# Patient Record
Sex: MALE | Race: WHITE | NOT HISPANIC OR LATINO | Employment: OTHER | URBAN - METROPOLITAN AREA
[De-identification: names, ages, dates, MRNs, and addresses within clinical notes are randomized per-mention and may not be internally consistent; named-entity substitution may affect disease eponyms.]

---

## 2019-08-05 ENCOUNTER — TELEPHONE (OUTPATIENT)
Dept: ENDOCRINOLOGY | Facility: CLINIC | Age: 59
End: 2019-08-05

## 2019-08-05 NOTE — TELEPHONE ENCOUNTER
REC'D A NP REFERRAL FROM DR. JOHNNA DONIS..CALLED THE PATIENT ON THE SUGGESTED NUMBER AND HAD TO LEAVE HIM A VM TO CALL THE [facility]

## 2024-05-04 ENCOUNTER — ANESTHESIA EVENT (OUTPATIENT)
Dept: GASTROENTEROLOGY | Facility: HOSPITAL | Age: 64
End: 2024-05-04
Payer: MEDICARE

## 2024-05-04 ENCOUNTER — APPOINTMENT (OUTPATIENT)
Dept: ULTRASOUND IMAGING | Facility: HOSPITAL | Age: 64
DRG: 378 | End: 2024-05-04
Payer: MEDICARE

## 2024-05-04 ENCOUNTER — HOSPITAL ENCOUNTER (INPATIENT)
Facility: HOSPITAL | Age: 64
LOS: 2 days | Discharge: LEFT BEFORE DISCHARGE | DRG: 378 | End: 2024-05-06
Attending: INTERNAL MEDICINE | Admitting: INTERNAL MEDICINE
Payer: MEDICARE

## 2024-05-04 ENCOUNTER — INPATIENT HOSPITAL (OUTPATIENT)
Dept: URBAN - METROPOLITAN AREA HOSPITAL 84 | Facility: HOSPITAL | Age: 64
End: 2024-05-04
Payer: MEDICAID

## 2024-05-04 DIAGNOSIS — K92.2 GASTROINTESTINAL HEMORRHAGE, UNSPECIFIED: ICD-10-CM

## 2024-05-04 DIAGNOSIS — D64.9 ANEMIA, UNSPECIFIED: ICD-10-CM

## 2024-05-04 DIAGNOSIS — Z87.898 H/O ASCITES: ICD-10-CM

## 2024-05-04 DIAGNOSIS — K92.2 UPPER GI BLEED: Primary | ICD-10-CM

## 2024-05-04 DIAGNOSIS — K74.60 UNSPECIFIED CIRRHOSIS OF LIVER: ICD-10-CM

## 2024-05-04 DIAGNOSIS — R18.8 OTHER ASCITES: ICD-10-CM

## 2024-05-04 LAB
ALBUMIN SERPL-MCNC: 2.9 G/DL (ref 3.5–5.2)
ALP SERPL-CCNC: 98 U/L (ref 39–117)
ALT SERPL W P-5'-P-CCNC: 39 U/L (ref 1–41)
ANION GAP SERPL CALCULATED.3IONS-SCNC: 10 MMOL/L (ref 5–15)
APAP SERPL-MCNC: <5 MCG/ML (ref 0–30)
AST SERPL-CCNC: 54 U/L (ref 1–40)
BASOPHILS # BLD AUTO: 0.04 10*3/MM3 (ref 0–0.2)
BASOPHILS NFR BLD AUTO: 0.7 % (ref 0–1.5)
BILIRUB CONJ SERPL-MCNC: 1.8 MG/DL (ref 0–0.3)
BILIRUB INDIRECT SERPL-MCNC: 1.4 MG/DL
BILIRUB SERPL-MCNC: 3.2 MG/DL (ref 0–1.2)
BUN SERPL-MCNC: 27 MG/DL (ref 8–23)
BUN/CREAT SERPL: 30.3 (ref 7–25)
CALCIUM SPEC-SCNC: 8 MG/DL (ref 8.6–10.5)
CHLORIDE SERPL-SCNC: 110 MMOL/L (ref 98–107)
CO2 SERPL-SCNC: 24 MMOL/L (ref 22–29)
CREAT SERPL-MCNC: 0.89 MG/DL (ref 0.76–1.27)
DEPRECATED RDW RBC AUTO: 61.7 FL (ref 37–54)
EGFRCR SERPLBLD CKD-EPI 2021: 95.7 ML/MIN/1.73
EOSINOPHIL # BLD AUTO: 0.05 10*3/MM3 (ref 0–0.4)
EOSINOPHIL NFR BLD AUTO: 0.9 % (ref 0.3–6.2)
ERYTHROCYTE [DISTWIDTH] IN BLOOD BY AUTOMATED COUNT: 17 % (ref 12.3–15.4)
FERRITIN SERPL-MCNC: 329.7 NG/ML (ref 30–400)
GLUCOSE BLDC GLUCOMTR-MCNC: 151 MG/DL (ref 70–105)
GLUCOSE SERPL-MCNC: 166 MG/DL (ref 65–99)
HAV IGM SERPL QL IA: ABNORMAL
HBV CORE IGM SERPL QL IA: ABNORMAL
HBV SURFACE AG SERPL QL IA: ABNORMAL
HCT VFR BLD AUTO: 26.5 % (ref 37.5–51)
HCT VFR BLD AUTO: 27.8 % (ref 37.5–51)
HCV AB SER QL: REACTIVE
HGB BLD-MCNC: 8.9 G/DL (ref 13–17.7)
HGB BLD-MCNC: 9.3 G/DL (ref 13–17.7)
HIV 1+2 AB+HIV1 P24 AG SERPL QL IA: NORMAL
IMM GRANULOCYTES # BLD AUTO: 0.02 10*3/MM3 (ref 0–0.05)
IMM GRANULOCYTES NFR BLD AUTO: 0.4 % (ref 0–0.5)
INR PPP: 1.15 (ref 0.93–1.1)
IRON 24H UR-MRATE: 195 MCG/DL (ref 59–158)
LYMPHOCYTES # BLD AUTO: 1.61 10*3/MM3 (ref 0.7–3.1)
LYMPHOCYTES NFR BLD AUTO: 28.9 % (ref 19.6–45.3)
MAGNESIUM SERPL-MCNC: 2 MG/DL (ref 1.6–2.4)
MCH RBC QN AUTO: 33.7 PG (ref 26.6–33)
MCHC RBC AUTO-ENTMCNC: 33.6 G/DL (ref 31.5–35.7)
MCV RBC AUTO: 100.4 FL (ref 79–97)
MONOCYTES # BLD AUTO: 0.26 10*3/MM3 (ref 0.1–0.9)
MONOCYTES NFR BLD AUTO: 4.7 % (ref 5–12)
NEUTROPHILS NFR BLD AUTO: 3.6 10*3/MM3 (ref 1.7–7)
NEUTROPHILS NFR BLD AUTO: 64.4 % (ref 42.7–76)
NRBC BLD AUTO-RTO: 0 /100 WBC (ref 0–0.2)
PHOSPHATE SERPL-MCNC: 3.7 MG/DL (ref 2.5–4.5)
PLATELET # BLD AUTO: 80 10*3/MM3 (ref 140–450)
PMV BLD AUTO: 8.5 FL (ref 6–12)
POTASSIUM SERPL-SCNC: 3.8 MMOL/L (ref 3.5–5.2)
PROT SERPL-MCNC: 6.2 G/DL (ref 6–8.5)
PROTHROMBIN TIME: 12.4 SECONDS (ref 9.6–11.7)
RBC # BLD AUTO: 2.64 10*6/MM3 (ref 4.14–5.8)
SODIUM SERPL-SCNC: 144 MMOL/L (ref 136–145)
WBC NRBC COR # BLD AUTO: 5.58 10*3/MM3 (ref 3.4–10.8)

## 2024-05-04 PROCEDURE — 83735 ASSAY OF MAGNESIUM: CPT | Performed by: INTERNAL MEDICINE

## 2024-05-04 PROCEDURE — 82948 REAGENT STRIP/BLOOD GLUCOSE: CPT

## 2024-05-04 PROCEDURE — 80076 HEPATIC FUNCTION PANEL: CPT | Performed by: INTERNAL MEDICINE

## 2024-05-04 PROCEDURE — 85610 PROTHROMBIN TIME: CPT | Performed by: INTERNAL MEDICINE

## 2024-05-04 PROCEDURE — 80143 DRUG ASSAY ACETAMINOPHEN: CPT | Performed by: NURSE PRACTITIONER

## 2024-05-04 PROCEDURE — G0432 EIA HIV-1/HIV-2 SCREEN: HCPCS | Performed by: INTERNAL MEDICINE

## 2024-05-04 PROCEDURE — 25810000003 SODIUM CHLORIDE 0.9 % SOLUTION: Performed by: INTERNAL MEDICINE

## 2024-05-04 PROCEDURE — 86381 MITOCHONDRIAL ANTIBODY EACH: CPT | Performed by: NURSE PRACTITIONER

## 2024-05-04 PROCEDURE — 82977 ASSAY OF GGT: CPT | Performed by: NURSE PRACTITIONER

## 2024-05-04 PROCEDURE — 76705 ECHO EXAM OF ABDOMEN: CPT

## 2024-05-04 PROCEDURE — 82103 ALPHA-1-ANTITRYPSIN TOTAL: CPT | Performed by: NURSE PRACTITIONER

## 2024-05-04 PROCEDURE — 82728 ASSAY OF FERRITIN: CPT | Performed by: NURSE PRACTITIONER

## 2024-05-04 PROCEDURE — 86376 MICROSOMAL ANTIBODY EACH: CPT | Performed by: NURSE PRACTITIONER

## 2024-05-04 PROCEDURE — 84100 ASSAY OF PHOSPHORUS: CPT | Performed by: INTERNAL MEDICINE

## 2024-05-04 PROCEDURE — 86015 ACTIN ANTIBODY EACH: CPT | Performed by: NURSE PRACTITIONER

## 2024-05-04 PROCEDURE — 85014 HEMATOCRIT: CPT | Performed by: NURSE PRACTITIONER

## 2024-05-04 PROCEDURE — 83540 ASSAY OF IRON: CPT | Performed by: NURSE PRACTITIONER

## 2024-05-04 PROCEDURE — 25010000002 FUROSEMIDE PER 20 MG: Performed by: INTERNAL MEDICINE

## 2024-05-04 PROCEDURE — 80048 BASIC METABOLIC PNL TOTAL CA: CPT | Performed by: INTERNAL MEDICINE

## 2024-05-04 PROCEDURE — 99222 1ST HOSP IP/OBS MODERATE 55: CPT | Performed by: INTERNAL MEDICINE

## 2024-05-04 PROCEDURE — 82105 ALPHA-FETOPROTEIN SERUM: CPT | Performed by: NURSE PRACTITIONER

## 2024-05-04 PROCEDURE — 80074 ACUTE HEPATITIS PANEL: CPT | Performed by: NURSE PRACTITIONER

## 2024-05-04 PROCEDURE — 85018 HEMOGLOBIN: CPT | Performed by: NURSE PRACTITIONER

## 2024-05-04 PROCEDURE — 86038 ANTINUCLEAR ANTIBODIES: CPT | Performed by: NURSE PRACTITIONER

## 2024-05-04 PROCEDURE — 82390 ASSAY OF CERULOPLASMIN: CPT | Performed by: NURSE PRACTITIONER

## 2024-05-04 PROCEDURE — 85025 COMPLETE CBC W/AUTO DIFF WBC: CPT | Performed by: INTERNAL MEDICINE

## 2024-05-04 PROCEDURE — 25010000002 CEFTRIAXONE PER 250 MG: Performed by: INTERNAL MEDICINE

## 2024-05-04 RX ORDER — BISACODYL 5 MG/1
5 TABLET, DELAYED RELEASE ORAL DAILY PRN
Status: DISCONTINUED | OUTPATIENT
Start: 2024-05-04 | End: 2024-05-06 | Stop reason: HOSPADM

## 2024-05-04 RX ORDER — ALBUMIN (HUMAN) 12.5 G/50ML
75 SOLUTION INTRAVENOUS ONCE
Qty: 300 ML | Refills: 0 | Status: DISCONTINUED | OUTPATIENT
Start: 2024-05-04 | End: 2024-05-06 | Stop reason: HOSPADM

## 2024-05-04 RX ORDER — BISACODYL 10 MG
10 SUPPOSITORY, RECTAL RECTAL DAILY PRN
Status: DISCONTINUED | OUTPATIENT
Start: 2024-05-04 | End: 2024-05-06 | Stop reason: HOSPADM

## 2024-05-04 RX ORDER — ALBUMIN (HUMAN) 12.5 G/50ML
62.5 SOLUTION INTRAVENOUS ONCE
Qty: 300 ML | Refills: 0 | Status: DISCONTINUED | OUTPATIENT
Start: 2024-05-04 | End: 2024-05-06 | Stop reason: HOSPADM

## 2024-05-04 RX ORDER — POLYETHYLENE GLYCOL 3350 17 G/17G
17 POWDER, FOR SOLUTION ORAL DAILY PRN
Status: DISCONTINUED | OUTPATIENT
Start: 2024-05-04 | End: 2024-05-06 | Stop reason: HOSPADM

## 2024-05-04 RX ORDER — ONDANSETRON 2 MG/ML
4 INJECTION INTRAMUSCULAR; INTRAVENOUS EVERY 6 HOURS PRN
Status: DISCONTINUED | OUTPATIENT
Start: 2024-05-04 | End: 2024-05-06 | Stop reason: HOSPADM

## 2024-05-04 RX ORDER — SODIUM CHLORIDE 0.9 % (FLUSH) 0.9 %
10 SYRINGE (ML) INJECTION AS NEEDED
Status: DISCONTINUED | OUTPATIENT
Start: 2024-05-04 | End: 2024-05-06 | Stop reason: HOSPADM

## 2024-05-04 RX ORDER — ALBUMIN (HUMAN) 12.5 G/50ML
87.5 SOLUTION INTRAVENOUS ONCE
Qty: 400 ML | Refills: 0 | Status: DISCONTINUED | OUTPATIENT
Start: 2024-05-04 | End: 2024-05-06 | Stop reason: HOSPADM

## 2024-05-04 RX ORDER — AMOXICILLIN 250 MG
2 CAPSULE ORAL 2 TIMES DAILY PRN
Status: DISCONTINUED | OUTPATIENT
Start: 2024-05-04 | End: 2024-05-06 | Stop reason: HOSPADM

## 2024-05-04 RX ORDER — SODIUM CHLORIDE 9 MG/ML
40 INJECTION, SOLUTION INTRAVENOUS AS NEEDED
Status: DISCONTINUED | OUTPATIENT
Start: 2024-05-04 | End: 2024-05-06 | Stop reason: HOSPADM

## 2024-05-04 RX ORDER — PANTOPRAZOLE SODIUM 40 MG/10ML
40 INJECTION, POWDER, LYOPHILIZED, FOR SOLUTION INTRAVENOUS
Status: DISCONTINUED | OUTPATIENT
Start: 2024-05-04 | End: 2024-05-06

## 2024-05-04 RX ORDER — ALBUMIN (HUMAN) 12.5 G/50ML
50 SOLUTION INTRAVENOUS ONCE
Qty: 200 ML | Refills: 0 | Status: DISCONTINUED | OUTPATIENT
Start: 2024-05-04 | End: 2024-05-06 | Stop reason: HOSPADM

## 2024-05-04 RX ORDER — FUROSEMIDE 10 MG/ML
60 INJECTION INTRAMUSCULAR; INTRAVENOUS ONCE
Status: COMPLETED | OUTPATIENT
Start: 2024-05-04 | End: 2024-05-04

## 2024-05-04 RX ORDER — ALBUMIN (HUMAN) 12.5 G/50ML
112.5 SOLUTION INTRAVENOUS ONCE
Qty: 450 ML | Refills: 0 | Status: DISCONTINUED | OUTPATIENT
Start: 2024-05-04 | End: 2024-05-06 | Stop reason: HOSPADM

## 2024-05-04 RX ORDER — SODIUM CHLORIDE 9 MG/ML
100 INJECTION, SOLUTION INTRAVENOUS CONTINUOUS
Status: DISCONTINUED | OUTPATIENT
Start: 2024-05-04 | End: 2024-05-06 | Stop reason: HOSPADM

## 2024-05-04 RX ORDER — SODIUM CHLORIDE 0.9 % (FLUSH) 0.9 %
10 SYRINGE (ML) INJECTION EVERY 12 HOURS SCHEDULED
Status: DISCONTINUED | OUTPATIENT
Start: 2024-05-04 | End: 2024-05-06 | Stop reason: HOSPADM

## 2024-05-04 RX ORDER — ALBUMIN (HUMAN) 12.5 G/50ML
100 SOLUTION INTRAVENOUS ONCE
Qty: 400 ML | Refills: 0 | Status: DISCONTINUED | OUTPATIENT
Start: 2024-05-04 | End: 2024-05-06 | Stop reason: HOSPADM

## 2024-05-04 RX ORDER — ALBUMIN (HUMAN) 12.5 G/50ML
37.5 SOLUTION INTRAVENOUS ONCE
Qty: 150 ML | Refills: 0 | Status: DISCONTINUED | OUTPATIENT
Start: 2024-05-04 | End: 2024-05-06 | Stop reason: HOSPADM

## 2024-05-04 RX ADMIN — FUROSEMIDE 60 MG: 10 INJECTION, SOLUTION INTRAMUSCULAR; INTRAVENOUS at 16:22

## 2024-05-04 RX ADMIN — SODIUM CHLORIDE 100 ML/HR: 9 INJECTION, SOLUTION INTRAVENOUS at 14:28

## 2024-05-04 RX ADMIN — Medication 10 ML: at 14:29

## 2024-05-04 RX ADMIN — CEFTRIAXONE 1000 MG: 1 INJECTION, POWDER, FOR SOLUTION INTRAMUSCULAR; INTRAVENOUS at 14:54

## 2024-05-04 RX ADMIN — PANTOPRAZOLE SODIUM 40 MG: 40 INJECTION, POWDER, FOR SOLUTION INTRAVENOUS at 16:22

## 2024-05-04 RX ADMIN — Medication 10 ML: at 20:36

## 2024-05-04 NOTE — H&P
[facility] Medicine Services  History & Physical    Patient Name: Len Fuller  : 1960  MRN: [id number]  Primary Care Physician:  Kevin, No Known  Date of admission: 2024  Date and Time of Service: 2024    Subjective      Chief Complaint: ? GI Bleed     History of Present Illness:   This is a 63 years old male with a past medical history of diabetes, hyperlipidemia, hypertension, chronic kidney disease who was transferred from outside hospital where he presented with complaints of dark stool.  He states he is also read about 3 weeks ago where he started noticing swelling in his bilateral lower extremities, abdominal distention and poor appetite.  This generalized malaise, shortness of breath and nausea.  Given significant abdominal swelling patient has not had a bowel movement in the last 2 days.  Given how he felt patient decided come to the ED and was transferred here for gastroenterology evaluation.    Vital signs showed a blood pressure of 130/68, heart rate 74 bpm, respiratory rate 19 saturating 96% on room air and afebrile 36.6 °C.    Labs showed sodium 144, potassium 3.8, bicarb 24, BUN 27, creatinine 0.89, glucose 166, calcium 8.0, AST 64, ALT 39, creatinine elevated 1.8, PT 12.4, INR 1.15, WBC 5.58, hemoglobin 8.9, hematocrit 26.5, platelet 80, acetaminophen level normal.        Review of Systems    Personal History     Past Medical History:   Diagnosis Date    Arthritis     Chronic kidney disease     Diabetes mellitus     Elevated cholesterol     GERD (gastroesophageal reflux disease)     Hypertension     Sleep apnea        Past Surgical History:   Procedure Laterality Date    BACK SURGERY         Family History: family history is not on file. Otherwise pertinent FHx was reviewed and not pertinent to current issue.    Social History:  reports that he has been smoking cigars. He started smoking about 40 years ago. He has never used smokeless tobacco. He reports that he does not  drink alcohol and does not use drugs.    Home Medications:  Prior to Admission Medications       None              Allergies:  Not on File    Objective      Vitals:      There is no height or weight on file to calculate BMI.  Physical Exam    Appearance: No apparent distress, non-toxic appearing   HEENT/Neck: Neck is supple, Extraocular movements intact  Cardiovascular: Regular Rate and Rhythm, No murmurs  Pulmonary: Bibasilar crackles , otherwise good air movement.   Abdomen: BS+, Soft, non-tender, distended, +ve fluid wave   Ext: No Cyanosis, Clubbing, Edema.  Neuro: Non-focal, Alert & Oriented x 3          Diagnostic Data:  Lab Results (last 24 hours)       ** No results found for the last 24 hours. **             Imaging Results (Last 24 Hours)       ** No results found for the last 24 hours. **              Assessment & Plan        This is a 63 y.o. male with:    Active and Resolved Problems  Active Hospital Problems    Diagnosis  POA    **Upper GI bleed [K92.2]  Yes      Resolved Hospital Problems   No resolved problems to display.       Upper GI Bleed   ? Cirrhosis  Ascitis  Diabetes  Hypertension  Hyperlipidemia  Thrombocytopenia  -Patient has no history of cirrhosis, denies chronic alcohol consumption, smokes 6 cigars daily.  -Presented to outside hospital with ? GI bleed and was transferred here for gastroenterology evaluation.  -He was seen by gastroenterology on presentation, on IV Protonix twice daily,.  -Right upper quadrant ultrasound ordered with Doppler. Will give 60 mg IV lasix x 1 and monitor   -Diagnostic and therapeutic paracentesis ordered, serology for cirrhosis workup ordered, continue to monitor hemoglobin and transfuse for hemoglobin less than 7.  -Anemia and trouble cytopenia likely in settings of cirrhosis, transfuse for hemoglobin less than 7 and platelet less than 10,000 or 30,000 if he develops an acute bleed  -Blood glucose at goal, continue sliding scale insulin for now.  -Continue  home medicines monitor.          DVT prophylaxis:SCD   Full code  Continue inpatient level of care   Plan discussed with patient and nurse       Mechanical DVT prophylaxis orders are present.              Signature:     This document has been electronically signed by Cash Ortiz MD on May 4, 2024 13:35 EDT   [facility] Hospitalist Team

## 2024-05-04 NOTE — PLAN OF CARE
Goal Outcome Evaluation:  Plan of Care Reviewed With: patient        Progress: no change  Outcome Evaluation: Patient shows no s/s of distress and vitals are stable.

## 2024-05-04 NOTE — CONSULTS
"GI CONSULT  NOTE:    Referring Provider:  Dr. Ortiz    Chief complaint: Melena    Subjective .     History of present illness: 63-year-old male with no history of liver disease per the patient and no alcohol abuse presents with a 2 to 3-week history of dark stools.  He is also been describing some fluid retention in his lower extremities as well as a weight gain from 165 pounds to over 200 pounds with significant abdominal distention.  He presented to the hospital because of generalized malaise, some shortness of breath, some nausea.  He also describes some regurgitation of darker colored liquid.  His last bowel movement was 2 days ago that was dark.  He has had no regurgitation over the last 2 days and no bowel movement over the last 2 days.  He was on \" antirejection medications\" for his cadaver bones/discs that were placed in his back over the last 2 years.  He has also been on prednisone for a significant amount of time.  Denies any NSAIDs recently.  Denies any Tylenol recently.      Endo History:  None    Past Medical History:  Past Medical History:   Diagnosis Date    Arthritis     Chronic kidney disease     Diabetes mellitus     Elevated cholesterol     GERD (gastroesophageal reflux disease)     Hypertension     Sleep apnea        Past Surgical History:  Past Surgical History:   Procedure Laterality Date    BACK SURGERY         Social History:  Social History     Tobacco Use    Smoking status: Every Day     Types: Cigars     Start date: 1984    Smokeless tobacco: Never   Vaping Use    Vaping status: Never Used   Substance Use Topics    Alcohol use: Never    Drug use: Never       Family History:  History reviewed. No pertinent family history.    Medications:  No medications prior to admission.       Scheduled Meds:cefTRIAXone, 1,000 mg, Intravenous, Q24H  octreotide, 50 mcg, Intravenous, Once  pantoprazole, 40 mg, Intravenous, BID AC  sodium chloride, 10 mL, Intravenous, Q12H      Continuous " Infusions:octreotide (SandoSTATIN) infusion, 50 mcg/hr  sodium chloride, 100 mL/hr, Last Rate: 100 mL/hr (05/04/24 0908)      PRN Meds:.  senna-docusate sodium **AND** polyethylene glycol **AND** bisacodyl **AND** bisacodyl    ondansetron    sodium chloride    sodium chloride    ALLERGIES:  Penicillins    ROS:  Review of Systems   Respiratory:  Positive for chest tightness and shortness of breath.    Gastrointestinal:  Positive for abdominal distention, abdominal pain and blood in stool.   Endocrine: Positive for cold intolerance.   Musculoskeletal:  Positive for back pain.   Neurological:  Positive for weakness.   All other systems reviewed and are negative.      Objective     Vital Signs:   There were no vitals filed for this visit.      Physical Exam:      General Appearance:    Awake and alert, in no acute distress   Head:    Normocephalic, without obvious abnormality, atraumatic   Eyes:            Conjunctivae normal, anicteric sclera   Ears:    Ears appear intact with no abnormalities noted   Throat:   No oral lesions, no thrush, oral mucosa moist   Neck:   No adenopathy, supple, no thyromegaly, no JVD   Lungs:     respirations regular, even and unlabored   Chest Wall:    No abnormalities observed   Abdomen:   Abdomen is mildly distended with fluid wave present.  Dull percussion.  Has cirrhotic appearing body habitus within the upper extremities and distended abdomen   Rectal:     Deferred   Extremities:   Moves all extremities well, 3+ edema up to his hips and his bilateral lower extremities   Pulses:   Pulses palpable and equal bilaterally   Skin:   No bleeding, bruising or rash, no jaundice   Lymph nodes:   No palpable adenopathy   Neurologic:   Cranial nerves 2 - 12 grossly intact, no asterixis, sensation intact       Results Review:   I reviewed the patient's labs and imaging.  Lab Results (last 24 hours)       ** No results found for the last 24 hours. **            Imaging Results (Last 24 Hours)        ** No results found for the last 24 hours. **               ASSESSMENT:  GI bleed  Cirrhosis  Ascites  Lower extremity edema  Anemia  Thrombocytopenia    Principal Problem:    Upper GI bleed       PLAN:  Octreotide drip after 50 mcg bolus  IV pantoprazole every 12 hours  Ceftriaxone every 24 hours  Right upper quadrant ultrasound with Dopplers  N.p.o. for EGD in the morning  Stat blood work including PT/INR, CMP, CBC  Hold blood thinners  Paracentesis with fluid studies  Serological workup for underlying liver disease  H&H every 6 hours    I discussed the patients findings and my recommendations with the patient.  Bro Esteves MD  05/04/24  14:43 EDT

## 2024-05-05 ENCOUNTER — INPATIENT HOSPITAL (OUTPATIENT)
Dept: URBAN - METROPOLITAN AREA HOSPITAL 84 | Facility: HOSPITAL | Age: 64
End: 2024-05-05
Payer: MEDICAID

## 2024-05-05 ENCOUNTER — ANESTHESIA (OUTPATIENT)
Dept: GASTROENTEROLOGY | Facility: HOSPITAL | Age: 64
End: 2024-05-05
Payer: MEDICARE

## 2024-05-05 DIAGNOSIS — K92.2 GASTROINTESTINAL HEMORRHAGE, UNSPECIFIED: ICD-10-CM

## 2024-05-05 DIAGNOSIS — K31.819 ANGIODYSPLASIA OF STOMACH AND DUODENUM WITHOUT BLEEDING: ICD-10-CM

## 2024-05-05 DIAGNOSIS — K44.9 DIAPHRAGMATIC HERNIA WITHOUT OBSTRUCTION OR GANGRENE: ICD-10-CM

## 2024-05-05 DIAGNOSIS — K22.10 ULCER OF ESOPHAGUS WITHOUT BLEEDING: ICD-10-CM

## 2024-05-05 DIAGNOSIS — T18.2XXA FOREIGN BODY IN STOMACH, INITIAL ENCOUNTER: ICD-10-CM

## 2024-05-05 DIAGNOSIS — K22.70 BARRETT'S ESOPHAGUS WITHOUT DYSPLASIA: ICD-10-CM

## 2024-05-05 LAB
ALBUMIN SERPL-MCNC: 3 G/DL (ref 3.5–5.2)
ALBUMIN/GLOB SERPL: 0.9 G/DL
ALP SERPL-CCNC: 100 U/L (ref 39–117)
ALPHA-FETOPROTEIN: 5.56 NG/ML (ref 0–8.3)
ALPHA1 GLOB MFR UR ELPH: 153 MG/DL (ref 90–200)
ALT SERPL W P-5'-P-CCNC: 38 U/L (ref 1–41)
ANION GAP SERPL CALCULATED.3IONS-SCNC: 12 MMOL/L (ref 5–15)
AST SERPL-CCNC: 51 U/L (ref 1–40)
BASOPHILS # BLD AUTO: 0.06 10*3/MM3 (ref 0–0.2)
BASOPHILS NFR BLD AUTO: 1 % (ref 0–1.5)
BILIRUB SERPL-MCNC: 2.9 MG/DL (ref 0–1.2)
BUN SERPL-MCNC: 27 MG/DL (ref 8–23)
BUN/CREAT SERPL: 27 (ref 7–25)
CALCIUM SPEC-SCNC: 7.9 MG/DL (ref 8.6–10.5)
CERULOPLASMIN SERPL-MCNC: 22 MG/DL (ref 16–31)
CHLORIDE SERPL-SCNC: 109 MMOL/L (ref 98–107)
CO2 SERPL-SCNC: 24 MMOL/L (ref 22–29)
CREAT SERPL-MCNC: 1 MG/DL (ref 0.76–1.27)
DEPRECATED RDW RBC AUTO: 60.2 FL (ref 37–54)
EGFRCR SERPLBLD CKD-EPI 2021: 84 ML/MIN/1.73
EOSINOPHIL # BLD AUTO: 0.07 10*3/MM3 (ref 0–0.4)
EOSINOPHIL NFR BLD AUTO: 1.2 % (ref 0.3–6.2)
ERYTHROCYTE [DISTWIDTH] IN BLOOD BY AUTOMATED COUNT: 16.8 % (ref 12.3–15.4)
GGT SERPL-CCNC: 57 U/L (ref 8–61)
GLOBULIN UR ELPH-MCNC: 3.2 GM/DL
GLUCOSE SERPL-MCNC: 143 MG/DL (ref 65–99)
HCT VFR BLD AUTO: 26 % (ref 37.5–51)
HCT VFR BLD AUTO: 26.6 % (ref 37.5–51)
HGB BLD-MCNC: 8.9 G/DL (ref 13–17.7)
HGB BLD-MCNC: 8.9 G/DL (ref 13–17.7)
IMM GRANULOCYTES # BLD AUTO: 0.02 10*3/MM3 (ref 0–0.05)
IMM GRANULOCYTES NFR BLD AUTO: 0.3 % (ref 0–0.5)
INR PPP: 1.15 (ref 0.93–1.1)
LYMPHOCYTES # BLD AUTO: 2.04 10*3/MM3 (ref 0.7–3.1)
LYMPHOCYTES NFR BLD AUTO: 33.7 % (ref 19.6–45.3)
MCH RBC QN AUTO: 34.2 PG (ref 26.6–33)
MCHC RBC AUTO-ENTMCNC: 34.2 G/DL (ref 31.5–35.7)
MCV RBC AUTO: 100 FL (ref 79–97)
MONOCYTES # BLD AUTO: 0.25 10*3/MM3 (ref 0.1–0.9)
MONOCYTES NFR BLD AUTO: 4.1 % (ref 5–12)
NEUTROPHILS NFR BLD AUTO: 3.62 10*3/MM3 (ref 1.7–7)
NEUTROPHILS NFR BLD AUTO: 59.7 % (ref 42.7–76)
NRBC BLD AUTO-RTO: 0 /100 WBC (ref 0–0.2)
PLATELET # BLD AUTO: 91 10*3/MM3 (ref 140–450)
PMV BLD AUTO: 8.7 FL (ref 6–12)
POTASSIUM SERPL-SCNC: 3.5 MMOL/L (ref 3.5–5.2)
PROT SERPL-MCNC: 6.2 G/DL (ref 6–8.5)
PROTHROMBIN TIME: 12.4 SECONDS (ref 9.6–11.7)
RBC # BLD AUTO: 2.6 10*6/MM3 (ref 4.14–5.8)
SODIUM SERPL-SCNC: 145 MMOL/L (ref 136–145)
WBC NRBC COR # BLD AUTO: 6.06 10*3/MM3 (ref 3.4–10.8)

## 2024-05-05 PROCEDURE — 85018 HEMOGLOBIN: CPT | Performed by: NURSE PRACTITIONER

## 2024-05-05 PROCEDURE — 85025 COMPLETE CBC W/AUTO DIFF WBC: CPT | Performed by: NURSE PRACTITIONER

## 2024-05-05 PROCEDURE — 80053 COMPREHEN METABOLIC PANEL: CPT | Performed by: NURSE PRACTITIONER

## 2024-05-05 PROCEDURE — 85014 HEMATOCRIT: CPT | Performed by: NURSE PRACTITIONER

## 2024-05-05 PROCEDURE — 43255 EGD CONTROL BLEEDING ANY: CPT | Performed by: INTERNAL MEDICINE

## 2024-05-05 PROCEDURE — 25010000002 CEFTRIAXONE PER 250 MG: Performed by: INTERNAL MEDICINE

## 2024-05-05 PROCEDURE — 25810000003 SODIUM CHLORIDE 0.9 % SOLUTION: Performed by: INTERNAL MEDICINE

## 2024-05-05 PROCEDURE — 25010000002 OCTREOTIDE PER 25 MCG: Performed by: INTERNAL MEDICINE

## 2024-05-05 PROCEDURE — 94761 N-INVAS EAR/PLS OXIMETRY MLT: CPT

## 2024-05-05 PROCEDURE — 85610 PROTHROMBIN TIME: CPT | Performed by: NURSE PRACTITIONER

## 2024-05-05 PROCEDURE — 25010000002 PROPOFOL 200 MG/20ML EMULSION: Performed by: ANESTHESIOLOGY

## 2024-05-05 PROCEDURE — 87902 NFCT AGT GNTYP ALYS HEP C: CPT | Performed by: NURSE PRACTITIONER

## 2024-05-05 PROCEDURE — 0W3P8ZZ CONTROL BLEEDING IN GASTROINTESTINAL TRACT, VIA NATURAL OR ARTIFICIAL OPENING ENDOSCOPIC: ICD-10-PCS | Performed by: INTERNAL MEDICINE

## 2024-05-05 PROCEDURE — 87522 HEPATITIS C REVRS TRNSCRPJ: CPT | Performed by: NURSE PRACTITIONER

## 2024-05-05 PROCEDURE — 94799 UNLISTED PULMONARY SVC/PX: CPT

## 2024-05-05 DEVICE — DEV CLIP HEMO RESOLUTION360/ULTR 235CM 17MM STRL: Type: IMPLANTABLE DEVICE | Site: STOMACH | Status: FUNCTIONAL

## 2024-05-05 RX ORDER — FLUMAZENIL 0.1 MG/ML
0.2 INJECTION INTRAVENOUS AS NEEDED
Status: DISCONTINUED | OUTPATIENT
Start: 2024-05-05 | End: 2024-05-05 | Stop reason: HOSPADM

## 2024-05-05 RX ORDER — NALBUPHINE HYDROCHLORIDE 10 MG/ML
10 INJECTION, SOLUTION INTRAMUSCULAR; INTRAVENOUS; SUBCUTANEOUS EVERY 4 HOURS PRN
Status: DISCONTINUED | OUTPATIENT
Start: 2024-05-05 | End: 2024-05-05 | Stop reason: HOSPADM

## 2024-05-05 RX ORDER — MEPERIDINE HYDROCHLORIDE 25 MG/ML
12.5 INJECTION INTRAMUSCULAR; INTRAVENOUS; SUBCUTANEOUS
Status: DISCONTINUED | OUTPATIENT
Start: 2024-05-05 | End: 2024-05-05 | Stop reason: HOSPADM

## 2024-05-05 RX ORDER — ONDANSETRON 4 MG/1
4 TABLET, ORALLY DISINTEGRATING ORAL EVERY 6 HOURS PRN
Status: CANCELLED | OUTPATIENT
Start: 2024-05-05

## 2024-05-05 RX ORDER — ALBUTEROL SULFATE 2.5 MG/3ML
2.5 SOLUTION RESPIRATORY (INHALATION) ONCE AS NEEDED
Status: DISCONTINUED | OUTPATIENT
Start: 2024-05-05 | End: 2024-05-05 | Stop reason: HOSPADM

## 2024-05-05 RX ORDER — DIPHENHYDRAMINE HYDROCHLORIDE 50 MG/ML
12.5 INJECTION INTRAMUSCULAR; INTRAVENOUS
Status: DISCONTINUED | OUTPATIENT
Start: 2024-05-05 | End: 2024-05-05 | Stop reason: HOSPADM

## 2024-05-05 RX ORDER — MIDAZOLAM HYDROCHLORIDE 1 MG/ML
1 INJECTION INTRAMUSCULAR; INTRAVENOUS
Status: DISCONTINUED | OUTPATIENT
Start: 2024-05-05 | End: 2024-05-05 | Stop reason: HOSPADM

## 2024-05-05 RX ORDER — PROPOFOL 10 MG/ML
INJECTION, EMULSION INTRAVENOUS AS NEEDED
Status: DISCONTINUED | OUTPATIENT
Start: 2024-05-05 | End: 2024-05-05 | Stop reason: SURG

## 2024-05-05 RX ORDER — ONDANSETRON 2 MG/ML
4 INJECTION INTRAMUSCULAR; INTRAVENOUS ONCE AS NEEDED
Status: DISCONTINUED | OUTPATIENT
Start: 2024-05-05 | End: 2024-05-05 | Stop reason: HOSPADM

## 2024-05-05 RX ORDER — ONDANSETRON 2 MG/ML
4 INJECTION INTRAMUSCULAR; INTRAVENOUS EVERY 6 HOURS PRN
Status: CANCELLED | OUTPATIENT
Start: 2024-05-05

## 2024-05-05 RX ORDER — LORAZEPAM 2 MG/ML
1 INJECTION INTRAMUSCULAR
Status: DISCONTINUED | OUTPATIENT
Start: 2024-05-05 | End: 2024-05-05 | Stop reason: HOSPADM

## 2024-05-05 RX ORDER — NALOXONE HCL 0.4 MG/ML
0.4 VIAL (ML) INJECTION AS NEEDED
Status: DISCONTINUED | OUTPATIENT
Start: 2024-05-05 | End: 2024-05-05 | Stop reason: HOSPADM

## 2024-05-05 RX ORDER — NALBUPHINE HYDROCHLORIDE 10 MG/ML
2 INJECTION, SOLUTION INTRAMUSCULAR; INTRAVENOUS; SUBCUTANEOUS EVERY 4 HOURS PRN
Status: DISCONTINUED | OUTPATIENT
Start: 2024-05-05 | End: 2024-05-05 | Stop reason: HOSPADM

## 2024-05-05 RX ADMIN — OCTREOTIDE ACETATE 50 MCG/HR: 500 INJECTION, SOLUTION INTRAVENOUS; SUBCUTANEOUS at 16:56

## 2024-05-05 RX ADMIN — Medication 10 ML: at 07:48

## 2024-05-05 RX ADMIN — CEFTRIAXONE 1000 MG: 1 INJECTION, POWDER, FOR SOLUTION INTRAMUSCULAR; INTRAVENOUS at 15:13

## 2024-05-05 RX ADMIN — POLYETHYLENE GLYCOL 3350 17 G: 17 POWDER, FOR SOLUTION ORAL at 21:04

## 2024-05-05 RX ADMIN — PROPOFOL 350 MG: 10 INJECTION, EMULSION INTRAVENOUS at 11:54

## 2024-05-05 RX ADMIN — Medication 10 ML: at 20:51

## 2024-05-05 RX ADMIN — PANTOPRAZOLE SODIUM 40 MG: 40 INJECTION, POWDER, FOR SOLUTION INTRAVENOUS at 07:48

## 2024-05-05 RX ADMIN — SODIUM CHLORIDE 100 ML/HR: 9 INJECTION, SOLUTION INTRAVENOUS at 20:58

## 2024-05-05 RX ADMIN — PANTOPRAZOLE SODIUM 40 MG: 40 INJECTION, POWDER, FOR SOLUTION INTRAVENOUS at 16:56

## 2024-05-05 RX ADMIN — OCTREOTIDE ACETATE 50 MCG/HR: 500 INJECTION, SOLUTION INTRAVENOUS; SUBCUTANEOUS at 05:59

## 2024-05-05 NOTE — PLAN OF CARE
Goal Outcome Evaluation:Patient a/o and able to make needs known, no needs at this time.

## 2024-05-05 NOTE — ANESTHESIA POSTPROCEDURE EVALUATION
Patient: Len Fuller    Procedure Summary       Date: 05/05/24 Room / Location: [facility] ENDOSCOPY 1 / [facility] ENDOSCOPY    Anesthesia Start: 1155 Anesthesia Stop: 1218    Procedure: ESOPHAGOGASTRODUODENOSCOPY Diagnosis:       Upper GI bleed      (Upper GI bleed [K92.2])    Surgeons: Bro Esteves MD Provider: Yo Wang MD    Anesthesia Type: general ASA Status: 4 - Emergent            Anesthesia Type: general    Vitals  Vitals Value Taken Time   /78 05/05/24 1255   Temp     Pulse 59 05/05/24 1257   Resp 16 05/05/24 1250   SpO2 100 % 05/05/24 1257   Vitals shown include unfiled device data.        Post Anesthesia Care and Evaluation    Patient location during evaluation: PHASE II  Patient participation: complete - patient participated  Level of consciousness: awake  Pain scale: See nurse's notes for pain score.  Pain management: adequate    Airway patency: patent  Anesthetic complications: No anesthetic complications  PONV Status: none  Cardiovascular status: acceptable  Respiratory status: acceptable and spontaneous ventilation  Hydration status: acceptable    Comments: Patient seen and examined postoperatively; vital signs stable; SpO2 greater than or equal to 90%; cardiopulmonary status stable; nausea/vomiting adequately controlled; pain adequately controlled; no apparent anesthesia complications; patient discharged from anesthesia care when discharge criteria were met

## 2024-05-05 NOTE — PROGRESS NOTES
I was consulted to see this patient for hepatitis C infection.  The patient has been diagnosed hepatitis C by GI service and they are already on the case and following the patient.  Treatment of hepatitis C is usually done by GI service as outpatient.  If there is another reason for infectious consult please clarify and recall our service.

## 2024-05-05 NOTE — PLAN OF CARE
Goal Outcome Evaluation:  Plan of Care Reviewed With: patient        Progress: no change  Outcome Evaluation: Patient shows no s/s of distress and vitals are stable. Pt voiced no concerns. Consent signed for paracentesis tomorrow. Call light within reach.

## 2024-05-05 NOTE — SIGNIFICANT NOTE
Patient does not wear a cpap/bipap @ home.  Explained to patient what a cpap/bipap is and that if her felt like he needed one to let the nurse know and we would set one up for him.  Set up 02 @ 2 litrs in case he felt like he needed I.

## 2024-05-05 NOTE — ANESTHESIA PREPROCEDURE EVALUATION
Anesthesia Evaluation     Patient summary reviewed and Nursing notes reviewed   NPO Solid Status: > 8 hours  NPO Liquid Status: > 8 hours           Airway   Mallampati: II  TM distance: >3 FB  Neck ROM: full  No difficulty expected  Dental - normal exam     Pulmonary - normal exam    breath sounds clear to auscultation  (+) ,sleep apnea  Cardiovascular - normal exam  Exercise tolerance: unable to assess    ECG reviewed  Rhythm: regular  Rate: normal    (+) hypertension, hyperlipidemia      Neuro/Psych- negative ROS  GI/Hepatic/Renal/Endo    (+) GERD, GI bleeding upper active bleeding, renal disease- CRI, diabetes mellitus    Musculoskeletal     Abdominal  - normal exam   Substance History - negative use     OB/GYN negative ob/gyn ROS         Other   arthritis,     ROS/Med Hx Other:   ASSESSMENT:  GI bleed  Cirrhosis  Ascites  Lower extremity edema  Anemia  Thrombocytopenia     Principal Problem:    Upper GI bleed        PLAN:  Octreotide drip after 50 mcg bolus  IV pantoprazole every 12 hours  Ceftriaxone every 24 hours  Right upper quadrant ultrasound with Dopplers  N.p.o. for EGD in the morning  Stat blood work including PT/INR, CMP, CBC  Hold blood thinners  Paracentesis with fluid studies  Serological workup for underlying liver disease  H&H every 6 hours     I discussed the patients findings and my recommendations with the patient.  Bro Esteves MD  05/04/24  14:43 EDT                           Anesthesia Plan    ASA 4 - emergent     general     (varices)  intravenous induction     Anesthetic plan, risks, benefits, and alternatives have been provided, discussed and informed consent has been obtained with: patient.      CODE STATUS:    Code Status (Patient has no pulse and is not breathing): CPR (Attempt to Resuscitate)  Medical Interventions (Patient has pulse or is breathing): Full Support

## 2024-05-05 NOTE — PROGRESS NOTES
Hospitalist Service   Daily Progress Note      Patient Name: Len Fuller  : 1960  MRN: [id number]  Primary Care Physician:  Provider, No Known  Date of admission: 2024      Subjective      Chief Complaint: Dark stool    Patient seen and examined this morning.  Taking over care today.  Complaining of abdominal pain due to distention, continues to have dark stool.  Denies any hematemesis.  No chest pain or shortness of breath.  Going for EGD today.  Awaiting paracentesis as well, likely tomorrow per nursing.    Pertinent positives as noted in HPI/subjective.  All other systems were reviewed and are negative.      Objective      Vitals:   Temp:  [97.5 °F (36.4 °C)-97.9 °F (36.6 °C)] 97.5 °F (36.4 °C)  Heart Rate:  [66-84] 66  Resp:  [15-19] 18  BP: (115-130)/(67-80) 125/68    Physical Exam:    General: Awake, alert, NAD  Eyes: PERRL, EOMI, conjunctivae are clear  Cardiovascular: Regular rate and rhythm, no murmurs  Respiratory: Clear to auscultation bilaterally, no wheezing or rales, unlabored breathing  Abdomen: Soft,  distended with mild generalized tenderness, positive bowel sounds, no guarding  Neurologic: A&O, CN grossly intact, moves all extremities spontaneously  Musculoskeletal: Normal range of motion, no other gross deformities  Skin: Warm, dry         Result Review    Result Review:  I have personally reviewed the results from the time of this admission to 2024 10:34 EDT and agree with these findings:  [x]  Laboratory  [x]  Microbiology  [x]  Radiology  []  EKG/Telemetry   []  Cardiology/Vascular   []  Pathology  []  Old records  []  Other:          Assessment & Plan      Brief Patient Summary:  Len Fuller is a 63 y.o. male who       albumin human, 37.5 g, Intravenous, Once   Or  albumin human, 50 g, Intravenous, Once   Or  albumin human, 62.5 g, Intravenous, Once   Or  albumin human, 75 g, Intravenous, Once   Or  albumin human, 87.5 g, Intravenous, Once   Or  albumin human, 100 g,  Intravenous, Once   Or  albumin human, 112.5 g, Intravenous, Once  cefTRIAXone, 1,000 mg, Intravenous, Q24H  pantoprazole, 40 mg, Intravenous, BID AC  sodium chloride, 10 mL, Intravenous, Q12H       octreotide (SandoSTATIN) infusion, 50 mcg/hr, Last Rate: 50 mcg/hr (05/05/24 0559)  sodium chloride, 100 mL/hr, Last Rate: 100 mL/hr (05/04/24 1428)         I have utilized all available, immediate resources to obtain, update, or review the patient's current medications including all prescriptions, over-the-counter products, herbals, cannabis/cannabidiol products, and vitamin.mineral/dietary (nutritional) supplements.    Active Hospital Problems:  Active Hospital Problems    Diagnosis     **Upper GI bleed        Assessment/Plan:     Cirrhosis, new onset  GI bleed  Ascites  Acute blood loss anemia  -No history of cirrhosis or chronic alcohol consumption, hep panel positive for hep C  -Presented with GI bleed, concern for variceal bleeding  -Continue Protonix, octreotide, IV ceftriaxone  -GI following and plan for EGD today  -Paracentesis pending with labs    Thrombocytopenia   -likely related to liver disease  -Monitor platelets, transfuse as needed    DVT prophylaxis   -SCDs    CODE STATUS:    Code Status (Patient has no pulse and is not breathing): CPR (Attempt to Resuscitate)  Medical Interventions (Patient has pulse or is breathing): Full Support      Disposition:      Electronically signed by Sabiha Carvajal DO, 05/05/24, 10:34 EDT.  [facility] Hospitalist Team      Part of this note may be an electronic transcription/translation of spoken language to printed text using the Dragon Dictation System.

## 2024-05-05 NOTE — OP NOTE
ESOPHAGOGASTRODUODENOSCOPY Procedure Report    Patient Name:  Len Fuller  YOB: 1960    Date of Surgery:  5/5/2024     Pre-Op Diagnosis:  Upper GI bleed [K92.2]       Postop diagnosis:  1.  Esophageal ulcer  2.  Irregular Z-line/Wu's esophagus  3.  Small hiatal hernia  4.  Food filling the stomach  5.  Gastric AVMs      Procedure/CPT® Codes:      Procedure(s):  ESOPHAGOGASTRODUODENOSCOPY with APC and Endo Clip for hemostasis    Staff:  Surgeon(s):  Bro Esteves MD      Anesthesia: Monitored Anesthesia Care    Description of Procedure:  A description of the procedure as well as risks, benefits and alternative methods were explained to the patient who voiced understanding and signed the corresponding consent form. A physical exam was performed and vital signs were monitored throughout the procedure.    An upper GI endoscope was placed into the mouth and proceeded through the esophagus, stomach and second portion of the duodenum without difficulty. The scope was then retroflexed and the fundus was visualized. The procedure was not difficult and there were no immediate complications.  There was no blood loss.    Impression:  1.  3 cm of salmon-colored mucosa in the distal esophagus with an area just proximal to the GE junction of white patulous material likely consistent with either underlying esophageal ulcer versus normal esophagus in the midst of Wu's esophagus.  No biopsies taken as he is here for GI bleed.  2.  Excessive amount of undigested food filling much of the stomach limiting our views of the fundus cardia proximal gastric body.  3.  2 AVMs that were visualized 1 on the incisura and the other on the distal aspect of the gastric body on the posterior wall.  APC was performed as they both had very erythematous markings indicative of recent bleeding with a single Endo Clip placed on the gastric body AVM.  Hemostasis was achieved  4.  Normal duodenal mucosa visualized to  D2    Recommendations:  Would benefit from repeat EGD after his stomach is empty for full evaluation for gastric varices or other gastric lesions that could be causing bleeding in the future.  No blood in the upper GI tract at this time.  Continue IV every 12 hours PPI for 24 more hours then switch to p.o.  Continue octreotide drip for 24 hours then can stop  Complete 5 days of antibiotics  Paracentesis with fluid studies  May benefit from diuretics  Hepatitis C viral load        Bro Esteves MD     Date: 5/5/2024    Time: 12:17 EDT

## 2024-05-06 ENCOUNTER — INPATIENT HOSPITAL (OUTPATIENT)
Dept: URBAN - METROPOLITAN AREA HOSPITAL 84 | Facility: HOSPITAL | Age: 64
End: 2024-05-06
Payer: MEDICAID

## 2024-05-06 ENCOUNTER — APPOINTMENT (OUTPATIENT)
Dept: INFUSION THERAPY | Facility: HOSPITAL | Age: 64
DRG: 378 | End: 2024-05-06
Payer: MEDICARE

## 2024-05-06 ENCOUNTER — APPOINTMENT (OUTPATIENT)
Dept: OTHER | Facility: HOSPITAL | Age: 64
DRG: 378 | End: 2024-05-06
Payer: MEDICARE

## 2024-05-06 VITALS
SYSTOLIC BLOOD PRESSURE: 140 MMHG | HEART RATE: 80 BPM | BODY MASS INDEX: 26.78 KG/M2 | OXYGEN SATURATION: 99 % | TEMPERATURE: 97.7 F | WEIGHT: 197.75 LBS | RESPIRATION RATE: 16 BRPM | HEIGHT: 72 IN | DIASTOLIC BLOOD PRESSURE: 82 MMHG

## 2024-05-06 DIAGNOSIS — R18.8 OTHER ASCITES: ICD-10-CM

## 2024-05-06 DIAGNOSIS — K92.1 MELENA: ICD-10-CM

## 2024-05-06 DIAGNOSIS — B19.20 UNSPECIFIED VIRAL HEPATITIS C WITHOUT HEPATIC COMA: ICD-10-CM

## 2024-05-06 DIAGNOSIS — N18.9 CHRONIC KIDNEY DISEASE, UNSPECIFIED: ICD-10-CM

## 2024-05-06 DIAGNOSIS — E11.22 TYPE 2 DIABETES MELLITUS WITH DIABETIC CHRONIC KIDNEY DISEAS: ICD-10-CM

## 2024-05-06 DIAGNOSIS — E78.5 HYPERLIPIDEMIA, UNSPECIFIED: ICD-10-CM

## 2024-05-06 DIAGNOSIS — D62 ACUTE POSTHEMORRHAGIC ANEMIA: ICD-10-CM

## 2024-05-06 DIAGNOSIS — K74.60 UNSPECIFIED CIRRHOSIS OF LIVER: ICD-10-CM

## 2024-05-06 LAB
ALBUMIN SERPL-MCNC: 3.2 G/DL (ref 3.5–5.2)
ALBUMIN/GLOB SERPL: 0.9 G/DL
ALP SERPL-CCNC: 96 U/L (ref 39–117)
ALT SERPL W P-5'-P-CCNC: 34 U/L (ref 1–41)
ANA SER QL: NEGATIVE
ANION GAP SERPL CALCULATED.3IONS-SCNC: 9 MMOL/L (ref 5–15)
APPEARANCE FLD: CLEAR
AST SERPL-CCNC: 44 U/L (ref 1–40)
BASOPHILS # BLD AUTO: 0.03 10*3/MM3 (ref 0–0.2)
BASOPHILS NFR BLD AUTO: 0.9 % (ref 0–1.5)
BILIRUB SERPL-MCNC: 3.4 MG/DL (ref 0–1.2)
BUN SERPL-MCNC: 23 MG/DL (ref 8–23)
BUN/CREAT SERPL: 27.7 (ref 7–25)
CALCIUM SPEC-SCNC: 7.9 MG/DL (ref 8.6–10.5)
CHLORIDE SERPL-SCNC: 111 MMOL/L (ref 98–107)
CO2 SERPL-SCNC: 22 MMOL/L (ref 22–29)
COLOR FLD: YELLOW
CREAT SERPL-MCNC: 0.83 MG/DL (ref 0.76–1.27)
DEPRECATED RDW RBC AUTO: 57.4 FL (ref 37–54)
EGFRCR SERPLBLD CKD-EPI 2021: 97.7 ML/MIN/1.73
EOSINOPHIL # BLD AUTO: 0.07 10*3/MM3 (ref 0–0.4)
EOSINOPHIL NFR BLD AUTO: 2.1 % (ref 0.3–6.2)
ERYTHROCYTE [DISTWIDTH] IN BLOOD BY AUTOMATED COUNT: 16.3 % (ref 12.3–15.4)
GLOBULIN UR ELPH-MCNC: 3.5 GM/DL
GLUCOSE SERPL-MCNC: 160 MG/DL (ref 65–99)
HBA1C MFR BLD: 5.2 % (ref 4.8–5.6)
HCT VFR BLD AUTO: 25.1 % (ref 37.5–51)
HGB BLD-MCNC: 8.6 G/DL (ref 13–17.7)
IMM GRANULOCYTES # BLD AUTO: 0.02 10*3/MM3 (ref 0–0.05)
IMM GRANULOCYTES NFR BLD AUTO: 0.6 % (ref 0–0.5)
INR PPP: 1.24 (ref 0.93–1.1)
LYMPHOCYTES # BLD AUTO: 0.7 10*3/MM3 (ref 0.7–3.1)
LYMPHOCYTES NFR BLD AUTO: 21 % (ref 19.6–45.3)
LYMPHOCYTES NFR FLD MANUAL: 67 %
MCH RBC QN AUTO: 33.6 PG (ref 26.6–33)
MCHC RBC AUTO-ENTMCNC: 34.3 G/DL (ref 31.5–35.7)
MCV RBC AUTO: 98 FL (ref 79–97)
MESOTHL CELL NFR FLD MANUAL: 4 %
MONOCYTES # BLD AUTO: 0.21 10*3/MM3 (ref 0.1–0.9)
MONOCYTES NFR BLD AUTO: 6.3 % (ref 5–12)
MONOCYTES NFR FLD: 27 %
NEUTROPHILS NFR BLD AUTO: 2.31 10*3/MM3 (ref 1.7–7)
NEUTROPHILS NFR BLD AUTO: 69.1 % (ref 42.7–76)
NEUTROPHILS NFR FLD MANUAL: 2 %
NRBC BLD AUTO-RTO: 0 /100 WBC (ref 0–0.2)
NUC CELL # FLD: 107 /MM3
PLATELET # BLD AUTO: 70 10*3/MM3 (ref 140–450)
PMV BLD AUTO: 8.5 FL (ref 6–12)
POTASSIUM SERPL-SCNC: 3.4 MMOL/L (ref 3.5–5.2)
PROT FLD-MCNC: 1 G/DL
PROT SERPL-MCNC: 6.7 G/DL (ref 6–8.5)
PROTHROMBIN TIME: 13.3 SECONDS (ref 9.6–11.7)
RBC # BLD AUTO: 2.56 10*6/MM3 (ref 4.14–5.8)
SODIUM SERPL-SCNC: 142 MMOL/L (ref 136–145)
WBC NRBC COR # BLD AUTO: 3.34 10*3/MM3 (ref 3.4–10.8)

## 2024-05-06 PROCEDURE — 25010000002 OCTREOTIDE PER 25 MCG: Performed by: INTERNAL MEDICINE

## 2024-05-06 PROCEDURE — 84157 ASSAY OF PROTEIN OTHER: CPT | Performed by: NURSE PRACTITIONER

## 2024-05-06 PROCEDURE — 83036 HEMOGLOBIN GLYCOSYLATED A1C: CPT | Performed by: INTERNAL MEDICINE

## 2024-05-06 PROCEDURE — 99232 SBSQ HOSP IP/OBS MODERATE 35: CPT | Performed by: NURSE PRACTITIONER

## 2024-05-06 PROCEDURE — 87205 SMEAR GRAM STAIN: CPT | Performed by: NURSE PRACTITIONER

## 2024-05-06 PROCEDURE — 85025 COMPLETE CBC W/AUTO DIFF WBC: CPT | Performed by: NURSE PRACTITIONER

## 2024-05-06 PROCEDURE — 87070 CULTURE OTHR SPECIMN AEROBIC: CPT | Performed by: NURSE PRACTITIONER

## 2024-05-06 PROCEDURE — 89051 BODY FLUID CELL COUNT: CPT | Performed by: NURSE PRACTITIONER

## 2024-05-06 PROCEDURE — 80053 COMPREHEN METABOLIC PANEL: CPT | Performed by: NURSE PRACTITIONER

## 2024-05-06 PROCEDURE — 85610 PROTHROMBIN TIME: CPT | Performed by: NURSE PRACTITIONER

## 2024-05-06 PROCEDURE — 0W9G3ZZ DRAINAGE OF PERITONEAL CAVITY, PERCUTANEOUS APPROACH: ICD-10-PCS | Performed by: RADIOLOGY

## 2024-05-06 PROCEDURE — 76942 ECHO GUIDE FOR BIOPSY: CPT

## 2024-05-06 PROCEDURE — 25010000002 LIDOCAINE 1 % SOLUTION

## 2024-05-06 RX ORDER — LIDOCAINE HYDROCHLORIDE 10 MG/ML
10 INJECTION, SOLUTION INFILTRATION; PERINEURAL ONCE
Status: COMPLETED | OUTPATIENT
Start: 2024-05-06 | End: 2024-05-06

## 2024-05-06 RX ORDER — LIDOCAINE HYDROCHLORIDE 10 MG/ML
INJECTION, SOLUTION INFILTRATION; PERINEURAL
Status: COMPLETED
Start: 2024-05-06 | End: 2024-05-06

## 2024-05-06 RX ORDER — PANTOPRAZOLE SODIUM 40 MG/1
40 TABLET, DELAYED RELEASE ORAL
Status: DISCONTINUED | OUTPATIENT
Start: 2024-05-06 | End: 2024-05-06 | Stop reason: HOSPADM

## 2024-05-06 RX ADMIN — PANTOPRAZOLE SODIUM 40 MG: 40 INJECTION, POWDER, FOR SOLUTION INTRAVENOUS at 09:26

## 2024-05-06 RX ADMIN — OCTREOTIDE ACETATE 50 MCG/HR: 500 INJECTION, SOLUTION INTRAVENOUS; SUBCUTANEOUS at 01:41

## 2024-05-06 RX ADMIN — LIDOCAINE HYDROCHLORIDE 10 ML: 10 INJECTION, SOLUTION INFILTRATION; PERINEURAL at 12:11

## 2024-05-06 NOTE — PLAN OF CARE
Goal Outcome Evaluation:         Patient voicing frustration feeling that things are not moving faster enough, states that he is leaving AMA once paracenteses is complete. Provider notified and patient educated on the risks of leaving before care is complete.

## 2024-05-06 NOTE — PROGRESS NOTES
[facility] MEDICINE SERVICE  DAILY PROGRESS NOTE    NAME: Len Fuller  : 1960  MRN: [id number]      LOS: 2 days     PROVIDER OF SERVICE: Carlos Whittaker MD    Chief Complaint: Upper GI bleed    History of Present Illness:   This is a 63 years old male with a past medical history of diabetes, hyperlipidemia, hypertension, chronic kidney disease who was transferred from outside hospital where he presented with complaints of dark stool.  He states he is also read about 3 weeks ago where he started noticing swelling in his bilateral lower extremities, abdominal distention and poor appetite.  This generalized malaise, shortness of breath and nausea.  Given significant abdominal swelling patient has not had a bowel movement in the last 2 days.  Given how he felt patient decided come to the ED and was transferred here for gastroenterology evaluation.     Vital signs showed a blood pressure of 130/68, heart rate 74 bpm, respiratory rate 19 saturating 96% on room air and afebrile 36.6 °C.     Labs showed sodium 144, potassium 3.8, bicarb 24, BUN 27, creatinine 0.89, glucose 166, calcium 8.0, AST 64, ALT 39, creatinine elevated 1.8, PT 12.4, INR 1.15, WBC 5.58, hemoglobin 8.9, hematocrit 26.5, platelet 80, acetaminophen level normal.     Subjective:     Interval History:    -6 patient with.  Chart was reviewed.  Patient was admitted with melena and had an EGD patient was found to have undigested food and possible esophageal ulcer/Wu's esophagus.  GI recommended PPI and repeat EGD along with octreotide and antibiotic..  Patient's abdomen is distended and waiting for paracentesis.  Ultrasound showed hepatic cirrhosis    Review of Systems:   Negative except what is listed above     Objective:     Vital Signs  Temp:  [97.2 °F (36.2 °C)-98.1 °F (36.7 °C)] 97.8 °F (36.6 °C)  Heart Rate:  [60-70] 70  Resp:  [12-18] 18  BP: (111-162)/(61-89) 142/77  Flow (L/min):  [10] 10   Body mass index is 26.82  kg/m².    Physical Exam    General Appearance:    Alert, cooperative, in no acute distress   Head:    Normocephalic, without obvious abnormality, atraumatic   Eyes:            conjunctivae and sclerae normal, no   icterus, no pallor, corneas  clear, PERRLA   Neck:   No adenopathy, supple, trachea midline, no thyromegaly, no   carotid bruit, no JVD   Lungs:     Clear to auscultation,respirations regular, even and                  unlabored    Heart:    Regular rhythm and normal rate, normal S1 and S2, no            murmur, no gallop, no rub, no click   Abdomen:     Normal bowel sounds, no masses, no organomegaly, soft        non-tender, distended, no guarding, no rebound                No tenderness   Extremities:   Moves all extremities well, no edema, no cyanosis, no             redness   Lymph nodes:   No palpable adenopathy   Neurologic:   Cranial nerves 2 - 12 grossly intact, sensation intact, DTR       present and equal bilaterally       Scheduled Meds   albumin human, 37.5 g, Intravenous, Once   Or  albumin human, 50 g, Intravenous, Once   Or  albumin human, 62.5 g, Intravenous, Once   Or  albumin human, 75 g, Intravenous, Once   Or  albumin human, 87.5 g, Intravenous, Once   Or  albumin human, 100 g, Intravenous, Once   Or  albumin human, 112.5 g, Intravenous, Once  cefTRIAXone, 1,000 mg, Intravenous, Q24H  pantoprazole, 40 mg, Intravenous, BID AC  sodium chloride, 10 mL, Intravenous, Q12H       PRN Meds     senna-docusate sodium **AND** polyethylene glycol **AND** bisacodyl **AND** bisacodyl    ondansetron    sodium chloride    sodium chloride   Infusions  octreotide (SandoSTATIN) infusion, 50 mcg/hr, Last Rate: 50 mcg/hr (05/06/24 0141)  sodium chloride, 100 mL/hr, Last Rate: 100 mL/hr (05/05/24 2058)          Diagnostic Data    Results from last 7 days   Lab Units 05/05/24  1400 05/05/24  0154   WBC 10*3/mm3  --  6.06   HEMOGLOBIN g/dL 8.9* 8.9*   HEMATOCRIT % 26.6* 26.0*   PLATELETS 10*3/mm3  --  91*    GLUCOSE mg/dL  --  143*   CREATININE mg/dL  --  1.00   BUN mg/dL  --  27*   SODIUM mmol/L  --  145   POTASSIUM mmol/L  --  3.5   AST (SGOT) U/L  --  51*   ALT (SGPT) U/L  --  38   ALK PHOS U/L  --  100   BILIRUBIN mg/dL  --  2.9*   ANION GAP mmol/L  --  12.0       US Liver    Result Date: 5/4/2024  Impression: 1. Cirrhosis of the liver. Moderate ascites. 2. Hepatopetal flow in the mildly distended portal vein. Electronically Signed: Binu Nuñez MD  5/4/2024 8:45 PM EDT  Workstation ID: [id number]       I have personally reviewed the patient's new results.     Assessment/Plan:     Active and Resolved Problems    Upper GI bleed  Ascites  Cirrhosis  Acute blood loss anemia  Lower extremity edema  Hepatitis C infection  Diabetes mellitus  Mixed hyperlipidemia  Hypertension  CKD  S/p spine surgery with cadaver bone/disc    Suggestion:    5/6-at this time I will continue current supportive care.  I reviewed the whole chart.    DVT prophylaxis:  Mechanical DVT prophylaxis orders are present.         Code status is   Code Status and Medical Interventions:   Ordered at: 05/04/24 1335     Code Status (Patient has no pulse and is not breathing):    CPR (Attempt to Resuscitate)     Medical Interventions (Patient has pulse or is breathing):    Full Support       Plan for disposition: 5/8    Time: 30 minutes    Signature: Electronically signed by Carlos Whittaker MD, 05/06/24, 07:10 EDT.  [facility] Hospitalist Team

## 2024-05-06 NOTE — PLAN OF CARE
Goal Outcome Evaluation:           Progress: no change  Outcome Evaluation: Pt denies pain so far this shift. Pt had EGD on 5/5. to have paracentesis today. Octreotide gtt infusing. NS infusing. Pt very tearful/anxious this shift. Will monitor.

## 2024-05-06 NOTE — CASE MANAGEMENT/SOCIAL WORK
Discharge Planning Assessment   [place]     Patient Name: Len Fuller  MRN: [id number]  Today's Date: 5/6/2024    Admit Date: 5/4/2024  Plan: Return home w/ tone Ribera.   Discharge Needs Assessment       Row Name 05/06/24 1231       Living Environment    People in Home spouse    Name(s) of People in Home Tone Ribera    Current Living Arrangements home    Potentially Unsafe Housing Conditions none    In the past 12 months has the electric, gas, oil, or water company threatened to shut off services in your home? No    Primary Care Provided by self    Provides Primary Care For no one    Family Caregiver if Needed spouse    Family Caregiver Names Tone Ribera    Quality of Family Relationships helpful;involved;supportive    Able to Return to Prior Arrangements yes       Resource/Environmental Concerns    Resource/Environmental Concerns none    Transportation Concerns none       Transportation Needs    In the past 12 months, has lack of transportation kept you from medical appointments or from getting medications? no    In the past 12 months, has lack of transportation kept you from meetings, work, or from getting things needed for daily living? No       Food Insecurity    Within the past 12 months, you worried that your food would run out before you got the money to buy more. Never true    Within the past 12 months, the food you bought just didn't last and you didn't have money to get more. Never true       Transition Planning    Patient/Family Anticipates Transition to home with family    Patient/Family Anticipated Services at Transition none    Transportation Anticipated car, drives self;family or friend will provide       Discharge Needs Assessment    Readmission Within the Last 30 Days no previous admission in last 30 days    Equipment Currently Used at Home cane, straight    Concerns to be Addressed denies needs/concerns at this time    Anticipated Changes Related to Illness none    Equipment Needed After  Discharge none    Provided Post Acute Provider List? N/A    Provided Post Acute Provider Quality & Resource List? N/A              Discharge Plan       Row Name 05/06/24 1231       Plan    Plan Return home w/ bernardo Ribera.    Patient/Family in Agreement with Plan yes    Plan Comments CM met with patient and fiance at bedside. Patient states he is independent with his ADLs, drives self, and current DME straight cane. Patient lives at home with his ficitlaly Ribera. Patient does not have a PCP and would not like help finding one. Patient does not have a preferred pharmacy. Patient is agreeable to enrollment in [employer]. Patient denies difficulty affording medications, food, or utilities. Patient states at time of discharge Mounika will provide transportation.          Expected Discharge Date and Time       Expected Discharge Date Expected Discharge Time    May 8, 2024            Demographic Summary       Row Name 05/06/24 1230       General Information    Admission Type inpatient    Arrived From emergency department    Referral Source admission list    Reason for Consult discharge planning;care coordination/care conference    Preferred Language English       Contact Information    Permission Granted to Share Info With               Functional Status       Row Name 05/06/24 1231       Functional Status    Usual Activity Tolerance moderate    Current Activity Tolerance moderate       Functional Status, IADL    Medications independent    Meal Preparation independent    Housekeeping independent    Laundry independent    Shopping independent       Mental Status    General Appearance WDL WDL       Mental Status Summary    Recent Changes in Mental Status/Cognitive Functioning no changes               Chloé Leal RN     Office Phone [phone]

## 2024-05-06 NOTE — PROGRESS NOTES
" LOS: 2 days   Patient Care Team:  Provider, No Known as PCP - General      Subjective \" I am okay\"    Interval History:     Subjective: He reports altered bowel habits for days but reports runny bowel movement this morning.  Denies abdominal pain but is distended.  No nausea or vomiting and tolerating diet.    ROS:   No chest pain, shortness of breath, or cough.        Medication Review:     Current Facility-Administered Medications:     albumin human 25 % IV SOLN 37.5 g, 37.5 g, Intravenous, Once **OR** albumin human 25 % IV SOLN 50 g, 50 g, Intravenous, Once **OR** albumin human 25 % IV SOLN 62.5 g, 62.5 g, Intravenous, Once **OR** albumin human 25 % IV SOLN 75 g, 75 g, Intravenous, Once **OR** albumin human 25 % IV SOLN 87.5 g, 87.5 g, Intravenous, Once **OR** albumin human 25 % IV SOLN 100 g, 100 g, Intravenous, Once **OR** albumin human 25 % IV SOLN 112.5 g, 112.5 g, Intravenous, Once, Dianna Ewing APRN    sennosides-docusate (PERICOLACE) 8.6-50 MG per tablet 2 tablet, 2 tablet, Oral, BID PRN **AND** polyethylene glycol (MIRALAX) packet 17 g, 17 g, Oral, Daily PRN, 17 g at 05/05/24 2104 **AND** bisacodyl (DULCOLAX) EC tablet 5 mg, 5 mg, Oral, Daily PRN **AND** bisacodyl (DULCOLAX) suppository 10 mg, 10 mg, Rectal, Daily PRN, Cash Ortiz MD    cefTRIAXone (ROCEPHIN) 1,000 mg in sodium chloride 0.9 % 100 mL MBP, 1,000 mg, Intravenous, Q24H, Bro Esteves MD, Last Rate: 200 mL/hr at 05/05/24 1513, 1,000 mg at 05/05/24 1513    ondansetron (ZOFRAN) injection 4 mg, 4 mg, Intravenous, Q6H PRN, Cash Ortiz MD    pantoprazole (PROTONIX) EC tablet 40 mg, 40 mg, Oral, BID AC, Lisa Grove, LEIGH    sodium chloride 0.9 % flush 10 mL, 10 mL, Intravenous, Q12H, Cash Ortiz MD, 10 mL at 05/05/24 2051    sodium chloride 0.9 % flush 10 mL, 10 mL, Intravenous, PRN, Cash Ortiz MD    sodium chloride 0.9 % infusion 40 mL, 40 mL, Intravenous, PRN, Cash Ortiz MD    sodium chloride 0.9 % " infusion, 100 mL/hr, Intravenous, Continuous, Cash Ortiz MD, Last Rate: 100 mL/hr at 05/05/24 2058, 100 mL/hr at 05/05/24 2058      Objective resting on side of bed, chronically ill-appearing but no acute distress, family at bedside    Vital Signs  Vitals:    05/05/24 1552 05/05/24 2033 05/05/24 2350 05/06/24 0700   BP: 162/84 153/89 142/77 147/81   BP Location: Left arm Left arm Left arm Left arm   Patient Position: Lying Sitting Sitting Sitting   Pulse: 70   64   Resp: 16 18 18 18   Temp: 97.2 °F (36.2 °C) 98.1 °F (36.7 °C) 97.8 °F (36.6 °C) 97.7 °F (36.5 °C)   TempSrc: Oral Oral Oral Oral   SpO2: 99%   94%   Weight:       Height:           Physical Exam:     General Appearance:    Awake and alert, in no acute distress   Head:    Normocephalic, without obvious abnormality   Eyes:          Conjunctivae normal, anicteric sclera   Throat:   No oral lesions, no thrush, oral mucosa moist   Neck:   supple, no JVD   Lungs:     respirations regular, even and unlabored   Abdomen:   Distended with ascites   Rectal:     Deferred   Extremities:   no cyanosis   Skin:   No bruising or rash,  +jaundice        Results Review:    CBC    Results from last 7 days   Lab Units 05/05/24  1400 05/05/24  0154 05/04/24  1821 05/04/24  1443   WBC 10*3/mm3  --  6.06  --  5.58   HEMOGLOBIN g/dL 8.9* 8.9* 9.3* 8.9*   PLATELETS 10*3/mm3  --  91*  --  80*     CMP   Results from last 7 days   Lab Units 05/05/24  0154 05/04/24  1443   SODIUM mmol/L 145 144   POTASSIUM mmol/L 3.5 3.8   CHLORIDE mmol/L 109* 110*   CO2 mmol/L 24.0 24.0   BUN mg/dL 27* 27*   CREATININE mg/dL 1.00 0.89   GLUCOSE mg/dL 143* 166*   ALBUMIN g/dL 3.0* 2.9*   BILIRUBIN mg/dL 2.9* 3.2*   ALK PHOS U/L 100 98   AST (SGOT) U/L 51* 54*   ALT (SGPT) U/L 38 39   MAGNESIUM mg/dL  --  2.0   PHOSPHORUS mg/dL  --  3.7     Cr Clearance Estimated Creatinine Clearance: 94.7 mL/min (by C-G formula based on SCr of 1 mg/dL).  Coag   Results from last 7 days   Lab Units  "05/05/24  0154 05/04/24  1443   INR  1.15* 1.15*     HbA1C No results found for: \"HGBA1C\"      Infection     UA      Microbiology Results (last 10 days)       ** No results found for the last 240 hours. **          Imaging Results (Last 72 Hours)       Procedure Component Value Units Date/Time    CT Outside Films [[id number]] Resulted: 05/06/24 0748     Updated: 05/06/24 0748    Narrative:      This procedure was auto-finalized with no dictation required.    US Liver [[id number]] Collected: 05/04/24 2043     Updated: 05/04/24 2048    Narrative:      US LIVER    Date of Exam: 5/4/2024 6:56 PM EDT    Indication: liver with dopplers.    Comparison: No comparisons available.    Technique: Grayscale and color Doppler ultrasound evaluation of the liver was performed.      Findings:  The liver is heterogeneous and nodular compatible with advanced cirrhosis. No solid or cystic liver abnormality. The portal vein is patent with hepatopetal flow. There is mild dilatation of the portal vein up to 17 mm. Hepatic veins are patent with flow   demonstrating normal respiratory variation. There is a small to moderate volume of ascites surrounding the liver. Liver measures 15.9 cm.      Impression:      Impression:    1. Cirrhosis of the liver. Moderate ascites.  2. Hepatopetal flow in the mildly distended portal vein.        Electronically Signed: Binu Nuñez MD    5/4/2024 8:45 PM EDT    Workstation ID: [id number]            Assessment & Plan   Melena  Anemia -macrocytic  Cirrhosis complicated by ascites  Lower extremity edema  Hepatitis C antibody positive  DMII  CKD  History of spinal surgery with cadaver bone/disc  Hyperlipidemia/hypertension     PLAN:  5/5/2024 EGD showed irregular Z-line with possible Wu's esophagus with possible esophageal ulceration, follow-up on biopsy.  Excessive amount of undigested food feeling much of the stomach limiting views.  2 AVMs in the gastric body s/p APC and clip placement.    Awaiting " repeat labs but denies any significant melena overnight.  Continue twice daily PPI.  Patient reports there is plan for discharge home today and would like to consider outpatient repeat EGD and screening colonoscopy at that time.  Paracentesis planned today with fluid studies, doubt SBP.  Liver serology workup in progress.  HCV antibody positive with genotype/RNA pending to assess for chronicity.  AFP 5.5 and HIV negative.  Ultrasound without mass.  Continue supportive care and await lab findings.  Plan inpatient versus outpatient repeat EGD (depending on labs) with outpatient colonoscopy.    Electronically signed by LEIGH Dejesus, 05/06/24, 9:53 AM EDT.

## 2024-05-07 LAB
HCV RNA SERPL NAA+PROBE-ACNC: NORMAL IU/ML
HCV RNA SERPL NAA+PROBE-ACNC: NORMAL IU/ML
HCV RNA SERPL NAA+PROBE-LOG IU: 6.17 LOG10 IU/ML
LKM-1 AB SER-ACNC: <1 UNITS (ref 0–20)
MITOCHONDRIA M2 IGG SER-ACNC: <20 UNITS (ref 0–20)
SMA IGG SER-ACNC: 7 UNITS (ref 0–19)
TEST INFORMATION: NORMAL

## 2024-05-07 NOTE — CASE MANAGEMENT/SOCIAL WORK
Case Management Discharge Note      Final Note: Left AMA.      Selected Continued Care - Discharged on 5/6/2024 Admission date: 5/4/2024 - Discharge disposition: Left Against Medical Advice       Transportation Services  Private: Car    Final Discharge Disposition Code: 07 - left AMA

## 2024-05-09 LAB
BACTERIA FLD CULT: NORMAL
GRAM STN SPEC: NORMAL
HCV GENTYP SERPL NAA+PROBE: NORMAL

## 2025-05-20 NOTE — PROGRESS NOTES
Encounter Date:05/21/2025        Patient ID: Len Fuller is a 64 y.o. male.      Chief Complaint:      History of Present Illness  64 years old man with history of cirrhosis, GI bleed, ascites.  He presents to cardiology office with complaints of palpitations.    ECG shows sinus rhythm/sinus bradycardia with sinus arrhythmia.  Heart rate 59, MT interval 164, QRS 92 and QTc 429 ms.    He also complains of chest pain, dyspnea on exertion, leg edema, shortness of breath, dizziness, lightheadedness.  He is currently on Aldactone and furosemide for ascites/liver cirrhosis      The following portions of the patient's history were reviewed and updated as appropriate: allergies, current medications, past family history, past medical history, past social history, past surgical history, and problem list.    Review of Systems   Constitutional: Positive for malaise/fatigue.   Cardiovascular:  Positive for chest pain, dyspnea on exertion, leg swelling and palpitations.   Respiratory:  Positive for shortness of breath. Negative for cough.    Gastrointestinal:  Negative for abdominal pain, nausea and vomiting.   Neurological:  Positive for dizziness, light-headedness, numbness and weakness. Negative for headaches.   All other systems reviewed and are negative.        Current Outpatient Medications:     ALPRAZolam (XANAX) 1 MG tablet, Take 1 tablet by mouth At Night As Needed., Disp: , Rfl:     furosemide (LASIX) 40 MG tablet, Take 1 tablet by mouth Daily., Disp: , Rfl:     hydrOXYzine (ATARAX) 10 MG tablet, Take 1 tablet by mouth 3 (Three) Times a Day As Needed for Itching., Disp: , Rfl:     Iron, Ferrous Sulfate, 325 (65 Fe) MG tablet, Daily., Disp: , Rfl:     levothyroxine (SYNTHROID, LEVOTHROID) 50 MCG tablet, Take 1 tablet by mouth Daily., Disp: , Rfl:     spironolactone (ALDACTONE) 100 MG tablet, Take 1 tablet by mouth Daily., Disp: , Rfl:     vitamin D (ERGOCALCIFEROL) 1.25 MG (88250 UT) capsule capsule, 1 capsule., Disp:  ", Rfl:     Current outpatient and discharge medications have been reconciled for the patient.  Reviewed by: Jason Pacheco MD       Allergies   Allergen Reactions    Penicillins Hives       History reviewed. No pertinent family history.    Past Surgical History:   Procedure Laterality Date    BACK SURGERY      ENDOSCOPY N/A 5/5/2024    Procedure: ESOPHAGOGASTRODUODENOSCOPY;  Surgeon: Bro Esteves MD;  Location: [facility] ENDOSCOPY;  Service: Gastroenterology;  Laterality: N/A;  Post: Irregular z line, hiatal hernia, esophageal ulcer, gastric AVM       Past Medical History:   Diagnosis Date    Arthritis     Chronic kidney disease     Diabetes mellitus     Elevated cholesterol     GERD (gastroesophageal reflux disease)     Hypertension     Sleep apnea        History reviewed. No pertinent family history.    Social History     Socioeconomic History    Marital status: Single   Tobacco Use    Smoking status: Every Day     Types: Cigars     Start date: 1984     Passive exposure: Current    Smokeless tobacco: Never   Vaping Use    Vaping status: Never Used   Substance and Sexual Activity    Alcohol use: Never    Drug use: Never    Sexual activity: Defer               Objective:       Physical Exam    /78 (BP Location: Left arm, Patient Position: Sitting, Cuff Size: Adult)   Pulse 55   Ht 182.9 cm (72\")   Wt 83 kg (183 lb)   SpO2 100%   BMI 24.82 kg/m²   The patient is alert, oriented and in no distress.    Vital signs as noted above.    Head and neck revealed no carotid bruits or jugular venous distension.  No thyromegaly or lymphadenopathy is present.    Lungs clear.  No wheezing.  Breath sounds are normal bilaterally.    Heart normal first and second heart sounds.  Loud systolic ejection murmur..  No pericardial rub is present.  No gallop is present.    Abdomen soft and nontender.  No organomegaly is present.    Extremities revealed good peripheral pulses with bilateral edema    Skin warm and " dry.    Musculoskeletal system is grossly normal.    CNS grossly normal.           Diagnosis Plan   1. Palpitations        2. SOB (shortness of breath)        3. Cirrhosis of liver with ascites, unspecified hepatic cirrhosis type        4. Upper GI bleed        5. Hypothyroidism (acquired)        6. Anxiety and depression        LAB RESULTS (LAST 7 DAYS)    CBC        BMP        CMP         BNP        TROPONIN        CoAg        Creatinine Clearance  CrCl cannot be calculated (Patient's most recent lab result is older than the maximum 30 days allowed.).    ABG        Radiology  No radiology results for the last day    EKG  Procedures    Stress test      Echocardiogram  Results for orders placed in visit on 10/24/19    SCANNED - ECHOCARDIOGRAM      Cardiac catheterization  No results found for this or any previous visit.          Assessment and Plan       Diagnoses and all orders for this visit:    1. Palpitations (Primary)  Obtain a Holter monitor to look for atrial fibrillation.  Poor candidate for anticoagulation if atrial fibrillation is detected.    2. SOB (shortness of breath)  I will obtain an echocardiogram as previous echocardiogram was more than 5 years ago  He also has a loud murmur  Continue Lasix and Aldactone for liver cirrhosis with ascites.    3. Cirrhosis of liver with ascites, unspecified hepatic cirrhosis type  Follow-up with GI.  Continue Lasix and Aldactone    4. Upper GI bleed  Status post EGD which showed esophageal ulcer, small hiatal hernia and gastric AVMs.  Continue PPI    5. Hypothyroidism (acquired)  Continue Synthroid    6. Anxiety and depression  Currently on hydroxyzine and alprazolam

## 2025-05-21 ENCOUNTER — OFFICE VISIT (OUTPATIENT)
Dept: CARDIOLOGY | Facility: CLINIC | Age: 65
End: 2025-05-21
Payer: MEDICARE

## 2025-05-21 VITALS
SYSTOLIC BLOOD PRESSURE: 144 MMHG | OXYGEN SATURATION: 100 % | BODY MASS INDEX: 24.79 KG/M2 | DIASTOLIC BLOOD PRESSURE: 78 MMHG | HEART RATE: 55 BPM | WEIGHT: 183 LBS | HEIGHT: 72 IN

## 2025-05-21 DIAGNOSIS — R00.2 PALPITATIONS: Primary | ICD-10-CM

## 2025-05-21 DIAGNOSIS — F41.9 ANXIETY AND DEPRESSION: ICD-10-CM

## 2025-05-21 DIAGNOSIS — F32.A ANXIETY AND DEPRESSION: ICD-10-CM

## 2025-05-21 DIAGNOSIS — R18.8 CIRRHOSIS OF LIVER WITH ASCITES, UNSPECIFIED HEPATIC CIRRHOSIS TYPE: ICD-10-CM

## 2025-05-21 DIAGNOSIS — E03.9 HYPOTHYROIDISM (ACQUIRED): ICD-10-CM

## 2025-05-21 DIAGNOSIS — K74.60 CIRRHOSIS OF LIVER WITH ASCITES, UNSPECIFIED HEPATIC CIRRHOSIS TYPE: ICD-10-CM

## 2025-05-21 DIAGNOSIS — K92.2 UPPER GI BLEED: ICD-10-CM

## 2025-05-21 DIAGNOSIS — R06.02 SOB (SHORTNESS OF BREATH): ICD-10-CM

## 2025-05-21 RX ORDER — FUROSEMIDE 40 MG/1
1 TABLET ORAL DAILY
COMMUNITY
Start: 2025-03-20

## 2025-05-21 RX ORDER — ALPRAZOLAM 1 MG/1
1 TABLET ORAL NIGHTLY PRN
COMMUNITY
Start: 2025-05-12

## 2025-05-21 RX ORDER — SPIRONOLACTONE 100 MG/1
1 TABLET, FILM COATED ORAL DAILY
COMMUNITY
Start: 2025-02-10

## 2025-05-21 RX ORDER — ERGOCALCIFEROL 1.25 MG/1
1 CAPSULE, LIQUID FILLED ORAL
COMMUNITY
Start: 2025-05-20 | End: 2026-05-15

## 2025-05-21 RX ORDER — HYDROXYZINE HYDROCHLORIDE 10 MG/1
10 TABLET, FILM COATED ORAL 3 TIMES DAILY PRN
COMMUNITY

## 2025-05-21 RX ORDER — LEVOTHYROXINE SODIUM 50 UG/1
1 TABLET ORAL DAILY
COMMUNITY
Start: 2025-04-27

## 2025-06-18 ENCOUNTER — OUTSIDE FACILITY SERVICE (OUTPATIENT)
Dept: CARDIOLOGY | Facility: CLINIC | Age: 65
End: 2025-06-18
Payer: MEDICARE

## 2025-06-18 ENCOUNTER — RESULTS FOLLOW-UP (OUTPATIENT)
Dept: CARDIOLOGY | Facility: CLINIC | Age: 65
End: 2025-06-18
Payer: MEDICARE

## 2025-06-18 RX ORDER — METOPROLOL SUCCINATE 25 MG/1
25 TABLET, EXTENDED RELEASE ORAL DAILY
Qty: 90 TABLET | Refills: 0 | Status: SHIPPED | OUTPATIENT
Start: 2025-06-18

## 2025-06-18 NOTE — TELEPHONE ENCOUNTER
Attempted to call patient to review results of his recent Holter monitor. Dr. Pacehco would like to start him on a beta blocker for the frequent PACs noted on his monitor. A new prescription was sent for Toprol XL 25 mg daily. He should keep his follow-up with Dr. Pacheco in [place] on 8/20/25.

## 2025-06-19 NOTE — TELEPHONE ENCOUNTER
Attempted to call patient to let him know.  The call would not go through, the recording stated cannot be completed as dialed.

## (undated) DEVICE — PK ENDO GI 50

## (undated) DEVICE — BITEBLOCK ENDO W/STRAP 60F A/ LF DISP

## (undated) DEVICE — FIAPC® PROBE W/ FILTER 2200 A OD 2.3MM/6.9FR; L 2.2M/7.2FT: Brand: ERBE